# Patient Record
Sex: MALE | Race: WHITE
[De-identification: names, ages, dates, MRNs, and addresses within clinical notes are randomized per-mention and may not be internally consistent; named-entity substitution may affect disease eponyms.]

---

## 2020-11-13 ENCOUNTER — HOSPITAL ENCOUNTER (EMERGENCY)
Dept: HOSPITAL 39 - ER | Age: 20
Discharge: HOME | End: 2020-11-13
Payer: COMMERCIAL

## 2020-11-13 VITALS — OXYGEN SATURATION: 99 % | TEMPERATURE: 98.5 F

## 2020-11-13 VITALS — DIASTOLIC BLOOD PRESSURE: 68 MMHG | SYSTOLIC BLOOD PRESSURE: 128 MMHG

## 2020-11-13 DIAGNOSIS — S63.502A: Primary | ICD-10-CM
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
## 2020-11-13 DIAGNOSIS — Z87.81: ICD-10-CM

## 2020-11-13 DIAGNOSIS — G89.29: ICD-10-CM
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
## 2020-11-13 DIAGNOSIS — Z98.890: ICD-10-CM

## 2020-11-13 NOTE — ED.PDOC
History of Present Illness





- General


Chief Complaint: Upper Extremity Injury


Stated Complaint: L WRIST SWOLLEN & PAINFUL


Time Seen by Provider: 11/13/20 20:14


Additional Information: 





Patient is a 20-year-old male who presents to the ED with chief complaint of 

left wrist pain status post fall.  Patient has chronic 5/10 pain in his left 

wrist following a fall in January 2020 from a second story roof where he 

severely injured his wrist and had ORIF of his carpal bones.  Patient had a 

mechanical trip and fall yesterday and landed on his left wrist and has 

exacerbated his chronic pain.  He indicates his discomfort is now 8 out of 10.  

Patient has no other injuries or clinical concerns today.  Patient takes only 

OTC Motrin at home for discomfort.





- History of Present Illness


Allergies/Adverse Reactions: 


Allergies





Penicillins Allergy (Verified 11/13/20 20:27)


   








Review of Systems





- Review of Systems


Constitutional: Denies: chills, fever


Respiratory: Denies: cough, short of breath


Cardiology: Denies: chest pain, palpitations


All other Systems: Reviewed and Negative





Family Medical History





- Family History


  ** Mother


Family History: Unknown





Physical Exam





- Physical Exam


General Appearance: Alert, Comfortable, No apparent distress, Well Developed, 

Well Nourished


Cardiovascular/Respiratory: no respiratory distress


Elbow/Forearm Exam: limited ROM - Patient with well-healed dorsal scar to the 

left wrist.  Patient has severely limited range of motion of wrist due to 

discomfort.  There is no edema, abrasion or erythema.  Patient with focal 

tenderness to palpation over the ulnar aspect of the wrist in the vicinity of 

the ulnar styloid.  Negative e


Hand Exam: non-tender





Progress





- Progress


Progress: 





11/13/20 20:20


Differential includes but is not limited to fracture, sprain, dislocation, 

contusion.





11/13/20 21:58


Patient reassessed.  His imaging is nonacute and shows no evidence of nonunion 

at his previous surgical site.  I have shown patient his x-rays.  Patient tells 

me that he had surgery at [facility] but that he has missed multiple appointments 

because of the distance to [facility] and he is wondering how to get back into their 

system.  I have given him instructions.  Patient indicates he would like to 

continue taking OTC Tylenol or Motrin for pain and he will follow back up with 

his and doctors at [facility].  Vital signs stable, patient is NAD and looks clinically

well and I believe is safe for discharge with outpatient follow-up.  Follow-up 

instructions, discharge instructions and return to ED precautions discussed with

patient.  Patient voices understanding and willingness to comply with 

instructions.  All  radiographic results have been discussed with the patient, 

and all questions answered.  Patient is happy with plan.





Departure





- Departure


Clinical Impression: 


Sprain of wrist, left


Qualifiers:


 Encounter type: initial encounter Qualified Code(s): S63.502A - Unspecified 

sprain of left wrist, initial encounter





Time of Disposition: 21:59


Disposition: Discharge to Home or Self Care


Condition: Good


Departure Forms:  ED Discharge - Pt. Copy, Patient Portal Self Enrollment


Instructions:  DI for Arm Pain, Wrist Sprain (DC)


Comments: 





As discussed, call your [facility] doctors in the hand surgery clinic to make an 

appointment for reevaluation and treatment options for your chronic wrist pain.

## 2020-11-13 NOTE — RAD
EXAM DESCRIPTION: 



Hand,Left 3 Views (accession [id number]), Wrist,Left 3 Views

(accession [id number])



CLINICAL HISTORY: 



fall, pain 



COMPARISON: 



None



FINDINGS: 



Three x-ray views of the left hand and wrist were submitted.

There is a screw at the level of the scaphoid bone. Deformity of

the scaphoid bone could be secondary to the prior injury.

Calcifications distal to the ulna could be related to prior

injury. There is soft tissue swelling of the level of the wrist.

There is no acute fracture or dislocation. Bone mineralization is

within normal limits. There is no radiopaque foreign body

material.



IMPRESSION: 



Soft tissue swelling at the level of the wrist.



No discrete acute fracture or dislocation. Evidence of prior

surgery at the scaphoid bone.



Electronically signed by:  Jayden Hernandez MD  11/13/2020 8:51 PM

[facility] Workstation: [id number]

## 2020-11-13 NOTE — RAD
EXAM DESCRIPTION: 



Hand,Left 3 Views (accession [id number]), Wrist,Left 3 Views

(accession [id number])



CLINICAL HISTORY: 



fall, pain 



COMPARISON: 



None



FINDINGS: 



Three x-ray views of the left hand and wrist were submitted.

There is a screw at the level of the scaphoid bone. Deformity of

the scaphoid bone could be secondary to the prior injury.

Calcifications distal to the ulna could be related to prior

injury. There is soft tissue swelling of the level of the wrist.

There is no acute fracture or dislocation. Bone mineralization is

within normal limits. There is no radiopaque foreign body

material.



IMPRESSION: 



Soft tissue swelling at the level of the wrist.



No discrete acute fracture or dislocation. Evidence of prior

surgery at the scaphoid bone.



Electronically signed by:  Jayden Hernandez MD  11/13/2020 8:51 PM

[facility] Workstation: [id number]